# Patient Record
(demographics unavailable — no encounter records)

---

## 2024-12-09 NOTE — DATA REVIEWED
[FreeTextEntry1] : I reviewed the results of her recent loop recorder reports, cardiology visit notes and most recent labs.

## 2024-12-09 NOTE — HISTORY OF PRESENT ILLNESS
[FreeTextEntry1] : Patient is here to establish relationship with a primary care physician.  She has no specific complaints today [de-identified] : Patient is a 72-year-old female with history of hyperlipidemia hypertension status post CVA in July 2023 status post thrombectomy.  Cause of her stroke was never determined and therefore she had an implantable loop recorder placed.  Her medications currently are aspirin 81 mg daily and atorvastatin 40 mg daily.  She also takes lisinopril 5 mg daily. Patient is up-to-date on her flu vaccine, COVID-vaccine.  She had Prevnar a couple years ago and she had both doses of shingles vaccine.  She is uncertain as to whether or not she had a Tdap within the last 10 years.  She will check her records and let me know and if not she will go to her local pharmacy for this vaccine. Patient is up-to-date on mammogram and GYN visits.  Her next visit with a gynecologist is in February. Patient had a colonoscopy 4 years ago and a polyp was removed.  She was told to return in 5 to 6 years.

## 2024-12-09 NOTE — ASSESSMENT
[FreeTextEntry1] : . #Hypertension: Patient's blood pressure is slightly elevated today but it is noteworthy that her  is at the moment in the emergency room being evaluated for pseudoaneurysm after he had a stent placed.  Her very recent blood pressure was at goal.  Continue lisinopril 5 mg daily.  #CVA: Will continue aspirin 81 mg daily  #Hypercholesterolemia: Continue atorvastatin 40 mg daily.  Will see patient in 6 months unless she has an issue that arises prior to that.  #Health maintenance: Patient is up-to-date on colorectal cancer screening, breast cancer screening, she is up-to-date on all vaccines except for perhaps Tdap.  She will check her records and obtain Tbit Tdap if she has not had it within the last 10 years.

## 2024-12-09 NOTE — HEALTH RISK ASSESSMENT
[Yes] : Yes [Never (0 pts)] : Never (0 points) [Never] : Never [de-identified] : Patient has 3 to 4 glasses of wine per week

## 2024-12-09 NOTE — REVIEW OF SYSTEMS
[Negative] : Genitourinary [FreeTextEntry4] : Patient has an issue with her right eustachian tube for which she sees a ENT doctor

## 2024-12-09 NOTE — PHYSICAL EXAM
[Normal] : affect was normal and insight and judgment were intact [de-identified] : Mild left ptosis [de-identified] : No lower extremity edema [de-identified] : Except for mild left ptosis

## 2025-02-03 NOTE — HISTORY OF PRESENT ILLNESS
[de-identified] : 72 year old female with history of basal cell carcinoma and stroke s/p ILR, who presents for follow up.  She had a CVA 7/2023 s/p thrombectomy.  S/p ILR.   Echo at the time with a hyperdynamic LV  She presents for routine follow up and has no device related complaints.   No palpitations, chest pain, syncope or near syncope.

## 2025-02-03 NOTE — DISCUSSION/SUMMARY
[FreeTextEntry1] : 72 year old female with history of basal cell carcinoma and stroke s/p ILR, who presents for follow up.  ILR interrogation reveals normal function.  No afib.  Home monitoring working.  No changes made today..  Follow up in 6 months or sooner if needed.  She knows to call with any questions or concerns.

## 2025-02-03 NOTE — HISTORY OF PRESENT ILLNESS
[de-identified] : 72 year old female with history of basal cell carcinoma and stroke s/p ILR, who presents for follow up.  She had a CVA 7/2023 s/p thrombectomy.  S/p ILR.   Echo at the time with a hyperdynamic LV  She presents for routine follow up and has no device related complaints.   No palpitations, chest pain, syncope or near syncope.

## 2025-02-03 NOTE — REVIEW OF SYSTEMS
[Negative] : Heme/Lymph [Weight Gain (___ Lbs)] : no recent weight gain [Feeling Fatigued] : not feeling fatigued [Weight Loss (___ Lbs)] : no recent weight loss [Fever] : no fever [Chills] : no chills [SOB] : no shortness of breath [Dyspnea on exertion] : not dyspnea during exertion [Chest Discomfort] : no chest discomfort [Palpitations] : no palpitations [Syncope] : no syncope

## 2025-02-03 NOTE — HISTORY OF PRESENT ILLNESS
[de-identified] : 72 year old female with history of basal cell carcinoma and stroke s/p ILR, who presents for follow up.  She had a CVA 7/2023 s/p thrombectomy.  S/p ILR.   Echo at the time with a hyperdynamic LV  She presents for routine follow up and has no device related complaints.   No palpitations, chest pain, syncope or near syncope.

## 2025-02-03 NOTE — ADDENDUM
[FreeTextEntry1] : I, Nishant Gonzalez, hereby attest that the medical record entry for this patient accurately reflects signatures/notations that I made on the Date of Service in my capacity as an Attending Physician when I treated/diagnosed the above patient. I do hereby attest that this information is true, accurate and complete to the best of my knowledge and I understand that any falsification, omission, or concealment of material fact may subject me to administrative, civil, or, criminal liability. I agree with the note as written by my PA in its entirety. I was present for the entire visit and supervised the entire visit and agree with the plan as outlined. I, Michael Fountain, am scribing for and the presence of Dr. Gonzalez the following sections: HPI, PMH,Family/social history, ROS, Physical Exam, Assessment / Plan.

## 2025-02-26 NOTE — DISCUSSION/SUMMARY
[facility]  Gastroenterology  Consult Note    Patient Name: Tasia Cardona  MRN: [id number]  Admission Date: 10/12/2023  Hospital Length of Stay: 0 days  Code Status: Full Code   Attending Provider: Osmin Dutta MD   Consulting Provider: Eren Orellana MD  Primary Care Physician: Pina Jones NP  Principal Problem:Chronic pancreatitis    Inpatient consult to [facility] ([facility]  Consult performed by: Eren Orellana MD  Consult ordered by: Noah Trinh MD        Subjective:     HPI:  Mr Cardona is a 23yo PMHx HIV on biktarvy, pancreatitis, PCV w/ splenic vein thrombosis, asthma presented to [facility] 10/12 with complaints of N/V, epigastric abdominal pain.    [facility] consulted 10/13 for consideration of endoscopic drainage of pancreatic fluid collection.     Recently admitted 09/18- 09/25 for abdominal pain, hematemesis. EGD completed 09/18 notable for Corinne-Chauhan tear. Antral erythema without evidence of H Pylori. Duodenum negative for celiac. CTAP w/ contrast 09/20 notable for gross necrotizing pancreatitis without acute necrotic collections. Repeat CTAP 09/29 notable for large communicating peripancreatic collection engulfing, compressing pancreas--measures 14.1 x 7.2 x 6.1cm    Reports this was his first episode of pancreatitis. States he has not drank ETOH for many months now and even before was only drinking on weekends. No reported history of excessive ETOH use. Still has GB. Reports mother had an episode of choledocholithiasis. Otherwise no history of pancreatitis or pancreatic cacner.    No fevers    Repeat CTAP w/ contrast 10/05 notable for two contiguous pancreatic fluid collections 7.4 x 6.5cm and 6.5 x 3.3cm.--appeared to be partially capsulted indicating pseudocyst.    VS upon arrival notable for AF, HR 120s, normotensive, CITLALI.  CBC w/o leukocytosis, Hgb 12-->9.4, Plts wnl  BMP w/ normal BUN/ Cr  LFTs w/ BR 0.4, AST/ ALT 42/ 26--> 20/ 15, ALP wnl    Lipase  [FreeTextEntry1] : stable exam CVA stable, continue secondary prevention HTN stable on meds/lifestyle Lipids stable on statin 142    Repeat CTAP w/ contrast notable for pancreas enhances relatively homogeneously noting there may be slight hypoenhancement at the level of the pancreatic tail. There is a lobular apparently interconnected fluid collection along the anterior aspect of the pancreas extending to the pancreatic tail, grossly similar in size and configuration as compared to the previous exam. Largest single component of fluid measures 5.7 x 6.8 cm.        Objective:     Vitals:    10/13/23 0802   BP: 119/75   Pulse: 85   Resp: 18   Temp: 98.5 °F (36.9 °C)         Constitutional:  not in acute distress and well developed  HENT: Head: Normal, normocephalic, atraumatic.  Eyes: conjunctiva clear and sclera nonicteric  GI: soft, non-tender, without masses or organomegaly  Skin: normal color  Neurological: alert        Assessment/Plan:     23yo PMHx HIV on biktarvy, pancreatitis, PCV w/ splenic vein thrombosis, asthma presented to [facility] 10[place]12 with complaints of N/V, epigastric abdominal pain.    AES consulted 10/13 for consideration of endoscopic drainage of pancreatic fluid collection.     Fluid collections still relatively new without any concerning symptoms of obstruction or infection at this time      Problem List:  Pancreatitis with fluid collections    Recommendations:  Hold off on endoscopic drainage at this time  Advance diet as tolerated  Ensure IV fluid hydration (atleast 1.5cc/kg/h)    Thank you for involving us in the care of Tasia Cardona. Please call with any additional questions, concerns or changes in the patient's clinical status. We will continue to follow.     Eren Orellana MD  Gastroenterology Fellow PGY VI  [facility]              [EKG obtained to assist in diagnosis and management of assessed problem(s)] : EKG obtained to assist in diagnosis and management of assessed problem(s)

## 2025-03-06 NOTE — HISTORY OF PRESENT ILLNESS
[FreeTextEntry1] : Patient is a 72year female with PMH of remote history of basal cell carcinoma, HLD, HTN, and Left MCA territory infarct who presents for follow-up.  Since last visit, patient reports no new stroke-like symptoms. She is tolerating her Aspirin and Atorvastatin.  She is doing PT/OT once per month and still finding benefit. Her physical endurance is much improved. She has no new complaints. She is traveling in [place] this September. She reports about 95% recovery.   /86; well controlled  ILR without any episodes of A fib since implantation.  Labs 11/2024: Lipids LDL 71, total cholesterol 151, Tgs 54; A1c 5.5%

## 2025-03-06 NOTE — PHYSICAL EXAM
[FreeTextEntry1] : Alert. Fully oriented. Speech and language are intact. Cranial nerves II-XII are intact apart form subtle R facial asymmetry. Motor exam reveals intact strength with individual muscle testing in bilateral upper and lower extremities. Sensation is intact to light touch in distal extremities. Finger-to-nose and heel-to-shin are intact. Rapid alternating movements are  slightly slow on R compared to the L. Gait is normal.

## 2025-03-06 NOTE — HISTORY OF PRESENT ILLNESS
[FreeTextEntry1] : Patient is a 72year female with PMH of remote history of basal cell carcinoma, HLD, HTN, and Left MCA territory infarct who presents for follow-up.  Since last visit, patient reports no new stroke-like symptoms. She is tolerating her Aspirin and Atorvastatin.  She is doing PT/OT once per month and still finding benefit. Her physical endurance is much improved. She has no new complaints. She is traveling in [place] this September. She reports about 95% recovery.   /86; well controlled  ILR without any episodes of A fib since implantation.  Labs 11/2024: Lipids LDL 71, total cholesterol 151, Tgs 54; A1c 5.5%

## 2025-03-06 NOTE — ASSESSMENT
[FreeTextEntry1] : Patient is a 71year female with PMH of remote history of basal cell carcinoma, HLD, HTN, and cryptogenic left MCA territory infarct (07/2023) who presents for follow-up. She is neurologically stable and optimized on her current medication regimen.  Plan: -Continue Aspirin and Atorvastatin for secondary stroke prevention -Continue f/u with EP for ILR interrogation -Carotid US - defers to next year -Encouraged healthy eating (DASH/Mediterranean), regular exercise -RTO in 1 year

## 2025-07-09 NOTE — DISCUSSION/SUMMARY
[FreeTextEntry1] : stable exam,  CVA stable, minimal residual, secondary prevention, ILR monitored no Afib HTN stable Lipids stable [EKG obtained to assist in diagnosis and management of assessed problem(s)] : EKG obtained to assist in diagnosis and management of assessed problem(s)

## 2025-07-30 NOTE — PHYSICAL EXAM
[TextEntry] : Constitutional: Patient is well nourished, well appearing and in no distress Pulmonary: No respiratory distress Neuro: Speech normal, alert and oriented Psychiatric: Normal mood, normal insight/judgement, normal affect
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
